# Patient Record
Sex: MALE | Race: WHITE | URBAN - METROPOLITAN AREA
[De-identification: names, ages, dates, MRNs, and addresses within clinical notes are randomized per-mention and may not be internally consistent; named-entity substitution may affect disease eponyms.]

---

## 2024-07-01 ENCOUNTER — OFFICE VISIT (OUTPATIENT)
Dept: URGENT CARE | Facility: CLINIC | Age: 39
End: 2024-07-01
Payer: COMMERCIAL

## 2024-07-01 VITALS
RESPIRATION RATE: 18 BRPM | WEIGHT: 191 LBS | OXYGEN SATURATION: 95 % | DIASTOLIC BLOOD PRESSURE: 80 MMHG | TEMPERATURE: 97.8 F | HEART RATE: 93 BPM | SYSTOLIC BLOOD PRESSURE: 141 MMHG

## 2024-07-01 DIAGNOSIS — R06.2 WHEEZING: ICD-10-CM

## 2024-07-01 DIAGNOSIS — R05.1 ACUTE COUGH: Primary | ICD-10-CM

## 2024-07-01 LAB — POC SARS-COV-2 AG: NORMAL

## 2024-07-01 PROCEDURE — 87426 SARSCOV CORONAVIRUS AG IA: CPT | Performed by: PHYSICIAN ASSISTANT

## 2024-07-01 PROCEDURE — 99203 OFFICE O/P NEW LOW 30 MIN: CPT | Performed by: PHYSICIAN ASSISTANT

## 2024-07-01 PROCEDURE — 1036F TOBACCO NON-USER: CPT | Performed by: PHYSICIAN ASSISTANT

## 2024-07-01 RX ORDER — ALBUTEROL SULFATE 90 UG/1
2 AEROSOL, METERED RESPIRATORY (INHALATION) EVERY 6 HOURS PRN
Qty: 18 G | Refills: 0 | Status: SHIPPED | OUTPATIENT
Start: 2024-07-01 | End: 2025-07-01

## 2024-07-01 RX ORDER — METHYLPREDNISOLONE 4 MG/1
TABLET ORAL
Qty: 21 TABLET | Refills: 0 | Status: SHIPPED | OUTPATIENT
Start: 2024-07-01

## 2024-07-01 RX ORDER — AZITHROMYCIN 250 MG/1
TABLET, FILM COATED ORAL
Qty: 6 TABLET | Refills: 0 | Status: SHIPPED | OUTPATIENT
Start: 2024-07-01 | End: 2024-07-06

## 2024-07-01 ASSESSMENT — PAIN SCALES - GENERAL: PAINLEVEL: 6

## 2024-07-01 NOTE — PROGRESS NOTES
Subjective   Patient ID: Toño Lozada is a 39 y.o. male who presents for Fever, Headache, and Cough.  Patient is here with a cough over the course of the last week as well as intermittent headache, intermittent fever.  He does have a history of exercise-induced asthma, sleep apnea using CPAP.  Notes some mild production with the cough.  Endorses some wheezing after coughing fits.  He denies any chest pain or shortness of breath no nausea vomiting diarrhea or abdominal pain    Past Medical History:   Diagnosis Date    Unspecified asthma, uncomplicated ([facility]HS-HCC)     Childhood asthma without complication         The remainder of the systems were reviewed and are negative unless noted above      Objective   /80   Pulse 93   Temp 36.6 °C (97.8 °F)   Resp 18   Wt 86.6 kg (191 lb)   SpO2 95%   Physical Exam  Vitals reviewed.   Constitutional:       General: He is not in acute distress.     Appearance: Normal appearance. He is not toxic-appearing.   HENT:      Head: Normocephalic.      Right Ear: Tympanic membrane and ear canal normal. No tenderness. No mastoid tenderness.      Left Ear: Tympanic membrane and ear canal normal. No tenderness. No mastoid tenderness.      Nose: No congestion or rhinorrhea.      Mouth/Throat:      Mouth: Mucous membranes are moist.      Pharynx: Oropharynx is clear. Posterior oropharyngeal erythema present.   Eyes:      Conjunctiva/sclera: Conjunctivae normal.   Cardiovascular:      Rate and Rhythm: Normal rate and regular rhythm.      Heart sounds: No murmur heard.  Pulmonary:      Effort: No respiratory distress.      Breath sounds: No stridor. Wheezing (Focal expiratory wheezing noted to the left lower lung field on exam) present. No rhonchi or rales.   Chest:      Chest wall: No tenderness.   Abdominal:      Tenderness: There is no abdominal tenderness. There is no guarding.   Musculoskeletal:         General: Normal range of motion.      Cervical back: No rigidity.    Lymphadenopathy:      Cervical: No cervical adenopathy.   Skin:     General: Skin is warm and dry.      Findings: No erythema.   Neurological:      General: No focal deficit present.      Mental Status: He is alert.         Assessment/Plan   Problem List Items Addressed This Visit       Acute cough - Primary    Relevant Medications    azithromycin (Zithromax Z-Jeffy) 250 mg tablet    Other Relevant Orders    POCT BD Veritor COVID-19 AG    Wheezing    Relevant Medications    albuterol 90 mcg/actuation inhaler    methylPREDNISolone (Medrol Dospak) 4 mg tablets      COVID test is negative  On exam wheezing noted to be limited to the left lower lung field on exhalation.   Azithromycin for coverage, Medrol Dosepak and albuterol for wheezing  Recommending follow-up with primary care here in the next week to assess for improvement

## 2024-07-02 NOTE — PATIENT INSTRUCTIONS
Assessment/Plan   Problem List Items Addressed This Visit       Acute cough - Primary    Relevant Medications    azithromycin (Zithromax Z-Jeffy) 250 mg tablet    Other Relevant Orders    POCT BD Veritor COVID-19 AG    Wheezing    Relevant Medications    albuterol 90 mcg/actuation inhaler    methylPREDNISolone (Medrol Dospak) 4 mg tablets      COVID test is negative  On exam wheezing noted to be limited to the left lower lung field on exhalation.   Azithromycin for coverage, Medrol Dosepak and albuterol for wheezing  Recommending follow-up with primary care here in the next week to assess for improvement